# Patient Record
Sex: FEMALE | Race: WHITE | NOT HISPANIC OR LATINO | RURAL
[De-identification: names, ages, dates, MRNs, and addresses within clinical notes are randomized per-mention and may not be internally consistent; named-entity substitution may affect disease eponyms.]

---

## 2021-03-09 ENCOUNTER — APPOINTMENT (RX ONLY)
Dept: RURAL CLINIC 1 | Facility: CLINIC | Age: 51
Setting detail: DERMATOLOGY
End: 2021-03-09
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
## 2021-03-09 DIAGNOSIS — D22 MELANOCYTIC NEVI: ICD-10-CM

## 2021-03-09 DIAGNOSIS — Z85.828 PERSONAL HISTORY OF OTHER MALIGNANT NEOPLASM OF SKIN: ICD-10-CM | Status: RESOLVED

## 2021-03-09 DIAGNOSIS — L81.4 OTHER MELANIN HYPERPIGMENTATION: ICD-10-CM
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
## 2021-03-09 PROBLEM — D22.9 MELANOCYTIC NEVI, UNSPECIFIED: Status: ACTIVE | Noted: 2021-03-09

## 2021-03-09 PROCEDURE — 99203 OFFICE O/P NEW LOW 30 MIN: CPT

## 2021-03-09 PROCEDURE — ? SKIN MEDICINALS

## 2021-03-09 PROCEDURE — ? PHOTO-DOCUMENTATION

## 2021-03-09 PROCEDURE — ? COUNSELING

## 2021-03-09 NOTE — PROCEDURE: [facility] [employer]
Sig: Apply to affected areas on face twice daily
Sig: Apply a thin layer to affected areas twice daily for 6 weeks
Sig: Apply to affected areas twice daily
Sig: Apply pea sized amount per area at night
Sig: Apply to face and ears twice daily for 5-7 days.
Sig: Apply nightly to warts nightly under occlusion
Sig: Pare wart with pumice stone & apply nightly under occlusion.
Product Type (1): Lightening Cream
Sig: Apply pea sized amount to face at night
Sig: Apply twice daily for 5 days
Sig: Wash affected areas daily.
Sig: Apply a thin layer once daily for 12 weeks
Detail Level: Zone
Intro Statement: I recommended the following products:
Sig: Apply to affected areas on face daily
Lightening Cream Prescription 1: Hydroquinone 12%, Kojic Acid 6%, Vitamin C 1%, Niacinamide 2% Cream